# Patient Record
Sex: MALE | Race: OTHER
[De-identification: names, ages, dates, MRNs, and addresses within clinical notes are randomized per-mention and may not be internally consistent; named-entity substitution may affect disease eponyms.]

---

## 2020-01-27 ENCOUNTER — HOSPITAL ENCOUNTER (EMERGENCY)
Dept: HOSPITAL 54 - ER | Age: 30
Discharge: HOME | End: 2020-01-27
Payer: MEDICAID

## 2020-01-27 VITALS — HEIGHT: 69 IN | WEIGHT: 247 LBS | BODY MASS INDEX: 36.58 KG/M2

## 2020-01-27 VITALS — DIASTOLIC BLOOD PRESSURE: 82 MMHG | SYSTOLIC BLOOD PRESSURE: 126 MMHG

## 2020-01-27 DIAGNOSIS — I10: ICD-10-CM

## 2020-01-27 DIAGNOSIS — J11.1: Primary | ICD-10-CM

## 2020-01-27 RX ADMIN — ACETAMINOPHEN ONE MG: 500 TABLET ORAL at 16:32

## 2020-01-27 RX ADMIN — IBUPROFEN ONE MG: 600 TABLET, FILM COATED ORAL at 16:32

## 2020-01-27 NOTE — NUR
PT aaox4. ambulatory. c/o cough and congestion x 2 days; + fever and sore 
throat. MD at bedside for eval. Will continue to monitor. no acute distress 
noted.

## 2020-01-27 NOTE — NUR
Patient discharged to home in stable condition. Written and verbal after care 
instructions given. Patient verbalizes understanding of instruction and RX. 
VSS. Pt ambulated with steady gait.